# Patient Record
Sex: FEMALE | URBAN - METROPOLITAN AREA
[De-identification: names, ages, dates, MRNs, and addresses within clinical notes are randomized per-mention and may not be internally consistent; named-entity substitution may affect disease eponyms.]

---

## 2022-12-13 ENCOUNTER — OFFICE VISIT (OUTPATIENT)
Dept: URBAN - METROPOLITAN AREA CLINIC 8 | Facility: CLINIC | Age: 52
End: 2022-12-13
Payer: COMMERCIAL

## 2022-12-13 DIAGNOSIS — H40.003 PREGLAUCOMA, UNSPECIFIED, BILATERAL: Primary | ICD-10-CM

## 2022-12-13 PROCEDURE — 99203 OFFICE O/P NEW LOW 30 MIN: CPT | Performed by: OPHTHALMOLOGY

## 2022-12-13 PROCEDURE — 92133 CPTRZD OPH DX IMG PST SGM ON: CPT | Performed by: OPHTHALMOLOGY

## 2022-12-13 ASSESSMENT — INTRAOCULAR PRESSURE
OD: 17
OS: 17

## 2022-12-13 NOTE — IMPRESSION/PLAN
Impression: Preglaucoma, unspecified, bilateral: H40.003. Plan: Discussed diagnosis in detail with patient. No treatment is required at this time. Advised patient of condition. Will continue to observe condition and or symptoms. Will continue to monitor IOP.

## 2023-01-03 ENCOUNTER — REFRACTIVE (OUTPATIENT)
Dept: URBAN - METROPOLITAN AREA CLINIC 21 | Facility: CLINIC | Age: 53
End: 2023-01-03

## 2023-01-03 DIAGNOSIS — H53.001 AMBLYOPIA OF RIGHT EYE: Primary | ICD-10-CM

## 2023-01-03 ASSESSMENT — KERATOMETRY
OD: 42.25
OS: 42.11

## 2023-01-03 NOTE — IMPRESSION/PLAN
Impression: Amblyopia of right eye: H53.001. Plan: Patient is not a candidate for MF RLE OD due to amblyopia and Hx of H LASIK OD only. Due to OS dominant, not recommended a MF RLE OS. Discussed RLE OD with standard lens distance only first, then OS to follow.  
Patient trialed monofocal lens+3.00 OD and patient will trial soft contact lens again